# Patient Record
Sex: MALE | Race: OTHER | HISPANIC OR LATINO | URBAN - METROPOLITAN AREA
[De-identification: names, ages, dates, MRNs, and addresses within clinical notes are randomized per-mention and may not be internally consistent; named-entity substitution may affect disease eponyms.]

---

## 2019-12-04 ENCOUNTER — OUTPATIENT (OUTPATIENT)
Dept: OUTPATIENT SERVICES | Facility: HOSPITAL | Age: 24
LOS: 1 days | Discharge: HOME | End: 2019-12-04
Payer: MEDICAID

## 2019-12-04 VITALS
SYSTOLIC BLOOD PRESSURE: 112 MMHG | HEART RATE: 88 BPM | RESPIRATION RATE: 27 BRPM | DIASTOLIC BLOOD PRESSURE: 73 MMHG | HEIGHT: 67 IN | OXYGEN SATURATION: 99 % | TEMPERATURE: 100 F | WEIGHT: 220.02 LBS

## 2019-12-04 DIAGNOSIS — K58.0 IRRITABLE BOWEL SYNDROME WITH DIARRHEA: ICD-10-CM

## 2019-12-04 DIAGNOSIS — R11.2 NAUSEA WITH VOMITING, UNSPECIFIED: ICD-10-CM

## 2019-12-04 PROCEDURE — 88312 SPECIAL STAINS GROUP 1: CPT | Mod: 26

## 2019-12-04 PROCEDURE — 88305 TISSUE EXAM BY PATHOLOGIST: CPT | Mod: 26

## 2019-12-04 PROCEDURE — 88313 SPECIAL STAINS GROUP 2: CPT

## 2019-12-04 PROCEDURE — 88305 TISSUE EXAM BY PATHOLOGIST: CPT

## 2019-12-04 PROCEDURE — 88313 SPECIAL STAINS GROUP 2: CPT | Mod: 26

## 2019-12-04 PROCEDURE — 88312 SPECIAL STAINS GROUP 1: CPT

## 2019-12-04 NOTE — ASU PATIENT PROFILE, ADULT - INTERPRETER NAME
Self, Pt. can speak english, but reads in [place], so the [place] consent is used.  Admission process done in English with some help from pt's sister Dasia Chapin, who is 26.

## 2019-12-08 DIAGNOSIS — R19.7 DIARRHEA, UNSPECIFIED: ICD-10-CM

## 2019-12-08 DIAGNOSIS — K44.9 DIAPHRAGMATIC HERNIA WITHOUT OBSTRUCTION OR GANGRENE: ICD-10-CM

## 2019-12-08 DIAGNOSIS — R11.2 NAUSEA WITH VOMITING, UNSPECIFIED: ICD-10-CM

## 2019-12-08 DIAGNOSIS — K29.50 UNSPECIFIED CHRONIC GASTRITIS WITHOUT BLEEDING: ICD-10-CM

## 2019-12-08 DIAGNOSIS — K20.9 ESOPHAGITIS, UNSPECIFIED: ICD-10-CM

## 2022-10-24 NOTE — ASU PREOP CHECKLIST - SITE MARKED BY ANESTHESIOLOGIST
Initiate Treatment: [facility] QHS Detail Level: Zone Render In Strict Bullet Format?: No Initiate Treatment: Minoxidil compound nightly\\nNutrafol daily Initiate Treatment: Fluocinonide bid to hands x 4 weeks\\nApply thick moisturizer over top and anytime after hand-washing Samples Given: Dr.Dans vail Initiate Treatment: Nystatin ointment mix with Dr. Barnard twice daily x 4 weeks n/a

## 2024-06-08 ENCOUNTER — EMERGENCY (EMERGENCY)
Facility: HOSPITAL | Age: 29
LOS: 0 days | Discharge: HOME | End: 2024-06-08
Attending: EMERGENCY MEDICINE
Payer: MEDICAID

## 2024-06-08 VITALS
HEART RATE: 65 BPM | TEMPERATURE: 98 F | RESPIRATION RATE: 18 BRPM | DIASTOLIC BLOOD PRESSURE: 64 MMHG | SYSTOLIC BLOOD PRESSURE: 127 MMHG | OXYGEN SATURATION: 99 %

## 2024-06-08 VITALS — HEIGHT: 67 IN

## 2024-06-08 DIAGNOSIS — R11.2 NAUSEA WITH VOMITING, UNSPECIFIED: ICD-10-CM

## 2024-06-08 DIAGNOSIS — H53.8 OTHER VISUAL DISTURBANCES: ICD-10-CM

## 2024-06-08 DIAGNOSIS — Z88.0 ALLERGY STATUS TO PENICILLIN: ICD-10-CM

## 2024-06-08 DIAGNOSIS — R42 DIZZINESS AND GIDDINESS: ICD-10-CM

## 2024-06-08 DIAGNOSIS — R07.89 OTHER CHEST PAIN: ICD-10-CM

## 2024-06-08 DIAGNOSIS — H81.399 OTHER PERIPHERAL VERTIGO, UNSPECIFIED EAR: ICD-10-CM

## 2024-06-08 DIAGNOSIS — R63.0 ANOREXIA: ICD-10-CM

## 2024-06-08 PROBLEM — R11.10 VOMITING, UNSPECIFIED: Chronic | Status: ACTIVE | Noted: 2019-12-04

## 2024-06-08 PROBLEM — K62.5 HEMORRHAGE OF ANUS AND RECTUM: Chronic | Status: ACTIVE | Noted: 2019-12-04

## 2024-06-08 LAB
ALBUMIN SERPL ELPH-MCNC: 4.2 G/DL — SIGNIFICANT CHANGE UP (ref 3.3–5)
ALP SERPL-CCNC: 69 U/L — SIGNIFICANT CHANGE UP (ref 40–120)
ALT FLD-CCNC: 21 U/L — SIGNIFICANT CHANGE UP (ref 12–78)
ANION GAP SERPL CALC-SCNC: 5 MMOL/L — SIGNIFICANT CHANGE UP (ref 5–17)
AST SERPL-CCNC: 17 U/L — SIGNIFICANT CHANGE UP (ref 15–37)
BASOPHILS # BLD AUTO: 0.03 K/UL — SIGNIFICANT CHANGE UP (ref 0–0.2)
BASOPHILS NFR BLD AUTO: 0.6 % — SIGNIFICANT CHANGE UP (ref 0–2)
BILIRUB SERPL-MCNC: 0.7 MG/DL — SIGNIFICANT CHANGE UP (ref 0.2–1.2)
BUN SERPL-MCNC: 13 MG/DL — SIGNIFICANT CHANGE UP (ref 7–23)
CALCIUM SERPL-MCNC: 9 MG/DL — SIGNIFICANT CHANGE UP (ref 8.5–10.1)
CHLORIDE SERPL-SCNC: 109 MMOL/L — HIGH (ref 96–108)
CK SERPL-CCNC: 131 U/L — SIGNIFICANT CHANGE UP (ref 26–308)
CO2 SERPL-SCNC: 27 MMOL/L — SIGNIFICANT CHANGE UP (ref 22–31)
CREAT SERPL-MCNC: 0.9 MG/DL — SIGNIFICANT CHANGE UP (ref 0.5–1.3)
D DIMER BLD IA.RAPID-MCNC: <150 NG/ML DDU — SIGNIFICANT CHANGE UP
EGFR: 119 ML/MIN/1.73M2 — SIGNIFICANT CHANGE UP
EOSINOPHIL # BLD AUTO: 0.04 K/UL — SIGNIFICANT CHANGE UP (ref 0–0.5)
EOSINOPHIL NFR BLD AUTO: 0.8 % — SIGNIFICANT CHANGE UP (ref 0–6)
ETHANOL SERPL-MCNC: <10 MG/DL — SIGNIFICANT CHANGE UP (ref 0–10)
GLUCOSE SERPL-MCNC: 91 MG/DL — SIGNIFICANT CHANGE UP (ref 70–99)
HCT VFR BLD CALC: 45.6 % — SIGNIFICANT CHANGE UP (ref 39–50)
HGB BLD-MCNC: 16.3 G/DL — SIGNIFICANT CHANGE UP (ref 13–17)
IMM GRANULOCYTES NFR BLD AUTO: 0.8 % — SIGNIFICANT CHANGE UP (ref 0–0.9)
LIDOCAIN IGE QN: 29 U/L — SIGNIFICANT CHANGE UP (ref 13–75)
LYMPHOCYTES # BLD AUTO: 1.43 K/UL — SIGNIFICANT CHANGE UP (ref 1–3.3)
LYMPHOCYTES # BLD AUTO: 27 % — SIGNIFICANT CHANGE UP (ref 13–44)
MCHC RBC-ENTMCNC: 30.8 PG — SIGNIFICANT CHANGE UP (ref 27–34)
MCHC RBC-ENTMCNC: 35.7 GM/DL — SIGNIFICANT CHANGE UP (ref 32–36)
MCV RBC AUTO: 86.2 FL — SIGNIFICANT CHANGE UP (ref 80–100)
MONOCYTES # BLD AUTO: 0.36 K/UL — SIGNIFICANT CHANGE UP (ref 0–0.9)
MONOCYTES NFR BLD AUTO: 6.8 % — SIGNIFICANT CHANGE UP (ref 2–14)
NEUTROPHILS # BLD AUTO: 3.4 K/UL — SIGNIFICANT CHANGE UP (ref 1.8–7.4)
NEUTROPHILS NFR BLD AUTO: 64 % — SIGNIFICANT CHANGE UP (ref 43–77)
PLATELET # BLD AUTO: 225 K/UL — SIGNIFICANT CHANGE UP (ref 150–400)
POTASSIUM SERPL-MCNC: 3.7 MMOL/L — SIGNIFICANT CHANGE UP (ref 3.5–5.3)
POTASSIUM SERPL-SCNC: 3.7 MMOL/L — SIGNIFICANT CHANGE UP (ref 3.5–5.3)
PROT SERPL-MCNC: 7.9 GM/DL — SIGNIFICANT CHANGE UP (ref 6–8.3)
RBC # BLD: 5.29 M/UL — SIGNIFICANT CHANGE UP (ref 4.2–5.8)
RBC # FLD: 12.2 % — SIGNIFICANT CHANGE UP (ref 10.3–14.5)
SODIUM SERPL-SCNC: 141 MMOL/L — SIGNIFICANT CHANGE UP (ref 135–145)
TROPONIN I, HIGH SENSITIVITY RESULT: <3 NG/L — SIGNIFICANT CHANGE UP
WBC # BLD: 5.3 K/UL — SIGNIFICANT CHANGE UP (ref 3.8–10.5)
WBC # FLD AUTO: 5.3 K/UL — SIGNIFICANT CHANGE UP (ref 3.8–10.5)

## 2024-06-08 PROCEDURE — 83690 ASSAY OF LIPASE: CPT

## 2024-06-08 PROCEDURE — 80307 DRUG TEST PRSMV CHEM ANLYZR: CPT

## 2024-06-08 PROCEDURE — 36415 COLL VENOUS BLD VENIPUNCTURE: CPT

## 2024-06-08 PROCEDURE — 84484 ASSAY OF TROPONIN QUANT: CPT

## 2024-06-08 PROCEDURE — 93010 ELECTROCARDIOGRAM REPORT: CPT

## 2024-06-08 PROCEDURE — 80053 COMPREHEN METABOLIC PANEL: CPT

## 2024-06-08 PROCEDURE — 96375 TX/PRO/DX INJ NEW DRUG ADDON: CPT

## 2024-06-08 PROCEDURE — 82550 ASSAY OF CK (CPK): CPT

## 2024-06-08 PROCEDURE — 85379 FIBRIN DEGRADATION QUANT: CPT

## 2024-06-08 PROCEDURE — 99284 EMERGENCY DEPT VISIT MOD MDM: CPT | Mod: 25

## 2024-06-08 PROCEDURE — 85025 COMPLETE CBC W/AUTO DIFF WBC: CPT

## 2024-06-08 PROCEDURE — 99285 EMERGENCY DEPT VISIT HI MDM: CPT

## 2024-06-08 PROCEDURE — 93005 ELECTROCARDIOGRAM TRACING: CPT

## 2024-06-08 PROCEDURE — 96374 THER/PROPH/DIAG INJ IV PUSH: CPT

## 2024-06-08 RX ORDER — SODIUM CHLORIDE 9 MG/ML
1000 INJECTION INTRAMUSCULAR; INTRAVENOUS; SUBCUTANEOUS ONCE
Refills: 0 | Status: COMPLETED | OUTPATIENT
Start: 2024-06-08 | End: 2024-06-08

## 2024-06-08 RX ORDER — ONDANSETRON 8 MG/1
4 TABLET, FILM COATED ORAL ONCE
Refills: 0 | Status: COMPLETED | OUTPATIENT
Start: 2024-06-08 | End: 2024-06-08

## 2024-06-08 RX ORDER — FAMOTIDINE 10 MG/ML
20 INJECTION INTRAVENOUS ONCE
Refills: 0 | Status: COMPLETED | OUTPATIENT
Start: 2024-06-08 | End: 2024-06-08

## 2024-06-08 RX ORDER — MECLIZINE HCL 12.5 MG
25 TABLET ORAL ONCE
Refills: 0 | Status: COMPLETED | OUTPATIENT
Start: 2024-06-08 | End: 2024-06-08

## 2024-06-08 RX ORDER — MECLIZINE HCL 12.5 MG
1 TABLET ORAL
Qty: 16 | Refills: 0
Start: 2024-06-08 | End: 2024-06-11

## 2024-06-08 RX ADMIN — FAMOTIDINE 20 MILLIGRAM(S): 10 INJECTION INTRAVENOUS at 10:29

## 2024-06-08 RX ADMIN — ONDANSETRON 4 MILLIGRAM(S): 8 TABLET, FILM COATED ORAL at 10:30

## 2024-06-08 RX ADMIN — SODIUM CHLORIDE 1000 MILLILITER(S): 9 INJECTION INTRAMUSCULAR; INTRAVENOUS; SUBCUTANEOUS at 10:30

## 2024-06-08 RX ADMIN — Medication 25 MILLIGRAM(S): at 10:29

## 2024-06-08 NOTE — ED ADULT NURSE NOTE - NSFALLUNIVINTERV_ED_ALL_ED
Bed/Stretcher in lowest position, wheels locked, appropriate side rails in place/Call bell, personal items and telephone in reach/Instruct patient to call for assistance before getting out of bed/chair/stretcher/Non-slip footwear applied when patient is off stretcher/[place] to call system/Physically safe environment - no spills, clutter or unnecessary equipment/Purposeful proactive rounding/Room/bathroom lighting operational, light cord in reach

## 2024-06-08 NOTE — ED PROVIDER NOTE - CARE PLAN
1 Principal Discharge DX:	Peripheral vertigo  Secondary Diagnosis:	Chest pain with low risk of acute coronary syndrome

## 2024-06-08 NOTE — ED PROVIDER NOTE - NSFOLLOWUPINSTRUCTIONS_ED_ALL_ED_FT
Take anti-dizzy medication as prescribed if needed for dizziness.  Drink plenty of non-alcoholic oral fluids.  Avoid alcohol.  Follow up this upcoming week with your own doctor.  Avoid sudden head/neck movements.      Vértigo  Vertigo  El vértigo es la sensación de que usted o las cosas que lo rodean se mueven cuando en realidad eso no sucede. Esta sensación puede aparecer y desaparecer en cualquier momento. El vértigo suele desaparecer solo. Esta afección puede ser peligrosa si ocurre cuando está realizando actividades katie conducir o trabajar con máquinas.    El médico le hará pruebas para encontrar la causa del vértigo. Estas pruebas también ayudarán al médico a decidir cuál es el mejor tratamiento para usted.    Siga estas instrucciones en [place] casa:  Comida y bebida    A comparison of three sample cups showing dark yellow, yellow, and pale yellow urine.  A sign showing that a person should not drink beer, wine, or liquor.  Erendira suficiente líquido para mantener el pis (orina) de color amarillo pálido.  No erendira alcohol.  Actividad    Retome hoda actividades normales cuando el médico le diga que es seguro.  Por la mañana, siéntese cleve a un lado de la cama. Cuando se sienta jossy, póngase lentamente de pie mientras se sostiene de algo, hasta que sepa que ha logrado el equilibrio.  Muévase lentamente. Evite determinadas posiciones o hacer movimientos repentinos del cuerpo o de la neena, katie se lo haya indicado el médico.  Use un bastón si tiene dificultad para ponerse de pie o caminar.  Si se siente mareado, siéntese de inmediato.  Evite realizar tareas o actividades que puedan causarle peligro a usted o a otras personas si se marea.  Evite agacharse si se siente mareado. En [place] casa, coloque los objetos de modo que le resulte fácil alcanzarlos sin doblarse o agacharse.  No conduzca vehículos ni opere maquinaria si se siente mareado.  Instrucciones generales    Use los medicamentos de venta estefanía y los recetados solamente katie se lo haya indicado el médico.  Concurra a todas las visitas de seguimiento.  Comuníquese con un médico si:  Los medicamentos no le alivian el vértigo.  Los problemas empeoran o le aparecen síntomas nuevos.  Tiene fiebre.  Siente ganas de vomitar (náuseas) o estas ganas empeoran.  Comienza a vomitar.  Hoda familiares o amigos observan cambios en [place] manera de actuar.  Pierde la sensibilidad (tiene entumecimiento) en alguna parte del cuerpo.  Siente pinchazos u hormigueo en alguna parte del cuerpo.  Solicite ayuda de inmediato si:  Esta mareado todo el tiempo.  Se desmaya.  Tiene alexis de neena muy intensos.  Tiene rigidez en el errol.  La cyndee brillante empieza a molestarle.  Tiene dificultad para moverse o para caminar.  Siente debilidad en las alida, los brazos o las piernas.  Presenta cambios en la audición o la manera de denise (visión).  Estos síntomas pueden indicar nakia emergencia. Solicite ayuda de inmediato. Comuníquese con el servicio de emergencias de [place] localidad (911 en [place]).  No espere a denise si los síntomas desaparecen.  No conduzca por hoda propios medios hasta el hospital.  Resumen  El vértigo es la sensación de que usted o las cosas que lo rodean se mueven cuando en realidad eso no sucede.  El médico le hará pruebas para encontrar la causa del vértigo.  Hang vez le indiquen que evite algunas tareas, posiciones o movimientos.  Comuníquese con un médico si los medicamentos no lo ayudan o si tiene fiebre, síntomas nuevos o un cambio en [place] manera de actuar.  Pida ayuda de inmediato si tiene alexis de neena muy intensos o si tiene cambios en la manera de hablar, oír o denise.  Esta información no tiene katie fin reemplazar el consejo del médico. Asegúrese de hacerle al médico cualquier pregunta que tenga.

## 2024-06-08 NOTE — ED ADULT NURSE NOTE - OBJECTIVE STATEMENT
Pt presents to the ED c/o "I feel like my blood pressure went down, and my chest is hurting" since Tuesday. Pt has been taking Tylenol for a headache. Denies PMH. Pt sent for EKG. p[t denies dizziness at this time pt is axo4 MD vega at bedside no codes called

## 2024-06-08 NOTE — ED PROVIDER NOTE - OBJECTIVE STATEMENT
30 y/o male with PMHx of vomiting and rectal bleeding with no prior surgeries presenting to the ED c/o left anterior chest discomfort since yesterday and stated that he felt like his blood pressure went down with other symptoms such as dizziness being present since Tuesday. Pt reports feeling tired, weak, dizzy (more vertigo than near syncope), intermittent blurry vision, decreased PO appetite but tolerating PO, nausea and vomited x1 this morning. Chest discomfort is nonpruritic, non radiating, with no other worsening factors. Pt has also been taking Tylenol for a headache that has since resolved. Pt denies body aches, fevers, SOB or sick contacts. Pt also reports social alcohols use, non recent, and no drugs use. Pt denies PMHx. Pt is allergic to penicillin. 28 y/o male with PMHx of vomiting and rectal bleeding with no prior surgeries presents to the ED c/o left anterior chest discomfort since yesterday and stated that he felt like his blood pressure was low with other symptoms such as dizziness being present since 6/4. Pt reports feeling tired, weak, dizzy (more vertigo than near-syncope), intermittent blurry vision, decreased PO appetite but tolerating PO, nausea and vomited x1 this morning. Chest discomfort is nonpleuritic, non-radiating, no specific worsening factors. Pt has also been taking Tylenol for a mild headache that has since resolved. Pt denies body aches, fevers, SOB or sick contacts. Pt also reports social alcohol use, none recently, and no drugs use. Pt denies PMHx. Pt is allergic to penicillin.

## 2024-06-08 NOTE — ED ADULT TRIAGE NOTE - CHIEF COMPLAINT QUOTE
Pt presents to the ED c/o "I feel like my blood pressure went down, I feel dizzy, and my chest is hurting" since Tuesday. Pt has been taking Tylenol for a headache. Denies PMH. Pt sent for EKG.

## 2024-06-08 NOTE — ED PROVIDER NOTE - CLINICAL SUMMARY MEDICAL DECISION MAKING FREE TEXT BOX
30 y/o male with PMHx of vomiting and rectal bleeding with no prior surgeries presenting to the ED c/o left anterior chest discomfort since yesterday and stated that he felt like his blood pressure went down with other symptoms such as dizziness being present since Tuesday. Pt reports feeling tired, weak, dizzy (more vertigo than near syncope), intermittent blurry vision, decreased PO appetite but tolerating PO, nausea and vomited x1 this morning. Chest discomfort is nonpruritic, non radiating, with no other worsening factors. Physical exam unremarkable and pt is not acutely ill with dizziness reproduced during EOM testing. Plan for EKG, orthostatic BP/P, Labs including troponin, CPK, d-dimer, alcohol, and U tox, IV fluids, IV Pepsin/Zofran, Meclozine PO, monitor, observe and reassess. 28 y/o male with PMHx of vomiting and rectal bleeding with no prior surgeries presenting to the ED c/o left anterior chest discomfort since yesterday and stated that he felt like his blood pressure went down with other symptoms such as dizziness being present since Tuesday. Pt reports feeling tired, weak, dizzy (more vertigo than near syncope), intermittent blurry vision, decreased PO appetite but tolerating PO, nausea and vomited x1 this morning. Chest discomfort is nonpruritic, non radiating, with no other worsening factors. Physical exam unremarkable and pt is not acutely ill with dizziness reproduced during EOM testing. Plan for EKG, orthostatic BP/P, Labs including troponin, CPK, d-dimer, alcohol, and U tox, IV fluids, IV Pepsin/Zofran, Meclozine PO, monitor, observe and reassess.    14:00, LIZZY Hodge MD:  Labs unremarkable.  Pt reassessed: feels better, back to usoh, no active complaints.  Pt agrees stable for DC on po Meclizine, outpt f/u this week with PCP 30 y/o male  presents to the ED c/o left anterior chest discomfort since yesterday and feeling tired, weak, dizzy (more vertigo than near syncope), intermittent blurry vision, decreased PO appetite but tolerating PO since Tuesday. +nausea and vomited x1 this morning.   Physical exam unremarkable and pt is not acutely ill with dizziness reproduced during EOM testing.   Plan: EKG, orthostatic BP/P, Labs including troponin, CPK, d-dimer, alcohol, and U tox, IV fluids, IV Pepcid/Zofran, Meclizine PO, monitor, observe and reassess.    14:00, LIZZY Hodge MD:  Labs unremarkable.  Pt reassessed: feels better, back to usoh, no active complaints.  Pt agrees stable for DC on po Meclizine, outpt f/u this week with PCP

## 2024-06-08 NOTE — ED PROVIDER NOTE - PATIENT PORTAL LINK FT
You can access the FollowMyHealth Patient Portal offered by [facility] by registering at the following website: [email or web address]. By joining [facility]’s FollowMyHealth portal, you will also be able to view your health information using other applications (apps) compatible with our system.